# Patient Record
Sex: MALE | Race: WHITE | NOT HISPANIC OR LATINO | Employment: OTHER | URBAN - METROPOLITAN AREA
[De-identification: names, ages, dates, MRNs, and addresses within clinical notes are randomized per-mention and may not be internally consistent; named-entity substitution may affect disease eponyms.]

---

## 2017-02-24 ENCOUNTER — OFFICE VISIT (OUTPATIENT)
Dept: FAMILY MEDICINE CLINIC | Facility: CLINIC | Age: 22
End: 2017-02-24

## 2017-02-24 VITALS
HEIGHT: 75 IN | HEART RATE: 82 BPM | DIASTOLIC BLOOD PRESSURE: 72 MMHG | TEMPERATURE: 98.3 F | RESPIRATION RATE: 16 BRPM | OXYGEN SATURATION: 98 % | WEIGHT: 186 LBS | SYSTOLIC BLOOD PRESSURE: 118 MMHG | BODY MASS INDEX: 23.13 KG/M2

## 2017-02-24 DIAGNOSIS — Z23 NEED FOR TD VACCINE: ICD-10-CM

## 2017-02-24 DIAGNOSIS — F31.61 BIPOLAR DISORDER, CURRENT EPISODE MIXED, MILD (HCC): Primary | ICD-10-CM

## 2017-02-24 DIAGNOSIS — Z11.4 SCREENING FOR HIV (HUMAN IMMUNODEFICIENCY VIRUS): ICD-10-CM

## 2017-02-24 PROCEDURE — 90715 TDAP VACCINE 7 YRS/> IM: CPT | Performed by: FAMILY MEDICINE

## 2017-02-24 PROCEDURE — 90632 HEPA VACCINE ADULT IM: CPT | Performed by: FAMILY MEDICINE

## 2017-02-24 PROCEDURE — 90746 HEPB VACCINE 3 DOSE ADULT IM: CPT | Performed by: FAMILY MEDICINE

## 2017-02-24 PROCEDURE — 90471 IMMUNIZATION ADMIN: CPT | Performed by: FAMILY MEDICINE

## 2017-02-24 PROCEDURE — 99204 OFFICE O/P NEW MOD 45 MIN: CPT | Performed by: FAMILY MEDICINE

## 2017-02-24 PROCEDURE — 90472 IMMUNIZATION ADMIN EACH ADD: CPT | Performed by: FAMILY MEDICINE

## 2017-02-24 NOTE — PROGRESS NOTES
Subjective   Ever Stokes is a 21 y.o. male.     History of Present Illness   Establish care  Needs vaccines UTD. Has never had Hep A and B  Also interested in HPV vaccine series.  Gets tested regularly, every 3 months for HIV.  The following portions of the patient's history were reviewed and updated as appropriate: allergies, current medications, past family history, past medical history, past social history, past surgical history and problem list.    Review of Systems   Constitutional: Negative for appetite change, chills, diaphoresis, fatigue, fever and unexpected weight change.   HENT: Negative for congestion, ear pain, mouth sores, postnasal drip, rhinorrhea, sinus pressure, sneezing, sore throat and trouble swallowing.    Eyes: Negative for pain, redness and visual disturbance.   Respiratory: Negative for apnea, cough, chest tightness, shortness of breath and wheezing.    Cardiovascular: Negative for chest pain, palpitations and leg swelling.   Gastrointestinal: Negative for abdominal distention, blood in stool, constipation, diarrhea and nausea.   Endocrine: Negative for cold intolerance, polydipsia, polyphagia and polyuria.   Genitourinary: Negative for difficulty urinating, dysuria, enuresis, flank pain and urgency.   Musculoskeletal: Negative for arthralgias, back pain, joint swelling, myalgias and neck pain.   Skin: Negative for color change, rash and wound.   Neurological: Negative for dizziness, syncope, weakness, light-headedness and numbness.   Hematological: Negative for adenopathy.   Psychiatric/Behavioral: Negative for agitation, behavioral problems and confusion. The patient is not nervous/anxious.        Objective   Physical Exam   Constitutional: He is oriented to person, place, and time. He appears well-developed and well-nourished. No distress.   HENT:   Right Ear: External ear normal.   Left Ear: External ear normal.   Nose: Nose normal.   Mouth/Throat: Oropharynx is clear and moist.    Eyes: Conjunctivae and EOM are normal. Pupils are equal, round, and reactive to light.   Neck: Normal range of motion. Neck supple. No thyromegaly present.   Cardiovascular: Normal rate, regular rhythm and normal heart sounds.    No murmur heard.  Pulmonary/Chest: Effort normal and breath sounds normal. No respiratory distress. He has no wheezes.   Abdominal: Soft. Bowel sounds are normal. He exhibits no distension and no mass. There is no tenderness. There is no rebound and no guarding. No hernia.   Musculoskeletal: Normal range of motion. He exhibits no edema or tenderness.   Lymphadenopathy:     He has no cervical adenopathy.   Neurological: He is alert and oriented to person, place, and time. He has normal reflexes.   Skin: Skin is warm and dry. No rash noted. He is not diaphoretic. No erythema. No pallor.   Psychiatric: He has a normal mood and affect. His behavior is normal. Judgment and thought content normal.   Nursing note and vitals reviewed.      Assessment/Plan   Ever was seen today for establish care.    Diagnoses and all orders for this visit:    Bipolar disorder, current episode mixed, mild  -     Ambulatory Referral to Psychiatry    Screening for HIV (human immunodeficiency virus)  -     Ambulatory Referral to Infectious Disease    Need for TD vaccine    Other orders  -     Hepatitis A Vaccine Adult IM  -     Hepatitis B Vaccine Adult IM  -     Tdap Vaccine Greater Than or Equal To 6yo IM        I personally spent over half of a total 45 minutes face to face with the patient in counseling and discussion and/or coordination of care as described above.

## 2017-03-07 ENCOUNTER — OFFICE VISIT (OUTPATIENT)
Dept: FAMILY MEDICINE CLINIC | Facility: CLINIC | Age: 22
End: 2017-03-07

## 2017-03-07 VITALS
RESPIRATION RATE: 16 BRPM | HEIGHT: 75 IN | SYSTOLIC BLOOD PRESSURE: 112 MMHG | OXYGEN SATURATION: 98 % | BODY MASS INDEX: 23.25 KG/M2 | WEIGHT: 187 LBS | DIASTOLIC BLOOD PRESSURE: 70 MMHG | HEART RATE: 84 BPM | TEMPERATURE: 97.9 F

## 2017-03-07 DIAGNOSIS — Z72.51 HIGH-RISK SEXUAL BEHAVIOR: Primary | ICD-10-CM

## 2017-03-07 LAB
ALBUMIN SERPL-MCNC: 4.9 G/DL (ref 3.2–4.8)
ALBUMIN/GLOB SERPL: 1.9 G/DL (ref 1.5–2.5)
ALP SERPL-CCNC: 82 U/L (ref 25–100)
ALT SERPL W P-5'-P-CCNC: 24 U/L (ref 7–40)
ANION GAP SERPL CALCULATED.3IONS-SCNC: 8 MMOL/L (ref 3–11)
ARTICHOKE IGE QN: 161 MG/DL (ref 0–130)
AST SERPL-CCNC: 18 U/L (ref 0–33)
BASOPHILS # BLD AUTO: 0.07 10*3/MM3 (ref 0–0.2)
BASOPHILS NFR BLD AUTO: 1.3 % (ref 0–1)
BILIRUB SERPL-MCNC: 0.6 MG/DL (ref 0.3–1.2)
BUN BLD-MCNC: 26 MG/DL (ref 9–23)
BUN/CREAT SERPL: 28.9 (ref 7–25)
CALCIUM SPEC-SCNC: 10.1 MG/DL (ref 8.7–10.4)
CHLORIDE SERPL-SCNC: 102 MMOL/L (ref 99–109)
CHOLEST SERPL-MCNC: 215 MG/DL (ref 0–200)
CO2 SERPL-SCNC: 27 MMOL/L (ref 20–31)
CREAT BLD-MCNC: 0.9 MG/DL (ref 0.6–1.3)
DEPRECATED RDW RBC AUTO: 45 FL (ref 37–54)
EOSINOPHIL # BLD AUTO: 0.09 10*3/MM3 (ref 0.1–0.3)
EOSINOPHIL NFR BLD AUTO: 1.6 % (ref 0–3)
ERYTHROCYTE [DISTWIDTH] IN BLOOD BY AUTOMATED COUNT: 13.4 % (ref 11.3–14.5)
GFR SERPL CREATININE-BSD FRML MDRD: 105 ML/MIN/1.73
GLOBULIN UR ELPH-MCNC: 2.6 GM/DL
GLUCOSE BLD-MCNC: 92 MG/DL (ref 70–100)
HCT VFR BLD AUTO: 46.6 % (ref 38.9–50.9)
HCV AB SER DONR QL: NORMAL
HDLC SERPL-MCNC: 57 MG/DL (ref 40–60)
HGB BLD-MCNC: 15.9 G/DL (ref 13.1–17.5)
HIV1+2 AB SER QL: NORMAL
IMM GRANULOCYTES # BLD: 0.02 10*3/MM3 (ref 0–0.03)
IMM GRANULOCYTES NFR BLD: 0.4 % (ref 0–0.6)
LYMPHOCYTES # BLD AUTO: 2.61 10*3/MM3 (ref 0.6–4.8)
LYMPHOCYTES NFR BLD AUTO: 46.9 % (ref 24–44)
MCH RBC QN AUTO: 31.4 PG (ref 27–31)
MCHC RBC AUTO-ENTMCNC: 34.1 G/DL (ref 32–36)
MCV RBC AUTO: 92.1 FL (ref 80–99)
MONOCYTES # BLD AUTO: 0.49 10*3/MM3 (ref 0–1)
MONOCYTES NFR BLD AUTO: 8.8 % (ref 0–12)
NEUTROPHILS # BLD AUTO: 2.28 10*3/MM3 (ref 1.5–8.3)
NEUTROPHILS NFR BLD AUTO: 41 % (ref 41–71)
PLATELET # BLD AUTO: 213 10*3/MM3 (ref 150–450)
PMV BLD AUTO: 10 FL (ref 6–12)
POTASSIUM BLD-SCNC: 4.7 MMOL/L (ref 3.5–5.5)
PROT SERPL-MCNC: 7.5 G/DL (ref 5.7–8.2)
RBC # BLD AUTO: 5.06 10*6/MM3 (ref 4.2–5.76)
SODIUM BLD-SCNC: 137 MMOL/L (ref 132–146)
TRIGL SERPL-MCNC: 66 MG/DL (ref 0–150)
TSH SERPL DL<=0.05 MIU/L-ACNC: 2.28 MIU/ML (ref 0.35–5.35)
WBC NRBC COR # BLD: 5.56 10*3/MM3 (ref 3.5–10.8)

## 2017-03-07 PROCEDURE — 85025 COMPLETE CBC W/AUTO DIFF WBC: CPT | Performed by: FAMILY MEDICINE

## 2017-03-07 PROCEDURE — 36415 COLL VENOUS BLD VENIPUNCTURE: CPT | Performed by: FAMILY MEDICINE

## 2017-03-07 PROCEDURE — 80053 COMPREHEN METABOLIC PANEL: CPT | Performed by: FAMILY MEDICINE

## 2017-03-07 PROCEDURE — 80061 LIPID PANEL: CPT | Performed by: FAMILY MEDICINE

## 2017-03-07 PROCEDURE — 90651 9VHPV VACCINE 2/3 DOSE IM: CPT | Performed by: FAMILY MEDICINE

## 2017-03-07 PROCEDURE — 99213 OFFICE O/P EST LOW 20 MIN: CPT | Performed by: FAMILY MEDICINE

## 2017-03-07 PROCEDURE — G0432 EIA HIV-1/HIV-2 SCREEN: HCPCS | Performed by: FAMILY MEDICINE

## 2017-03-07 PROCEDURE — 86803 HEPATITIS C AB TEST: CPT | Performed by: FAMILY MEDICINE

## 2017-03-07 PROCEDURE — 90471 IMMUNIZATION ADMIN: CPT | Performed by: FAMILY MEDICINE

## 2017-03-07 PROCEDURE — 84443 ASSAY THYROID STIM HORMONE: CPT | Performed by: FAMILY MEDICINE

## 2017-03-07 NOTE — PROGRESS NOTES
Subjective   Ever Stokes is a 21 y.o. male.     History of Present Illness   Pt wants to go on HIV med for prevention of HIV. Wants to have an appointment with ID but needs labs first. Has had negative HIV tests in past.  The following portions of the patient's history were reviewed and updated as appropriate: allergies, current medications, past family history, past medical history, past social history, past surgical history and problem list.    Review of Systems   Constitutional: Negative for appetite change, chills, diaphoresis, fatigue, fever and unexpected weight change.   HENT: Negative for congestion, ear pain, mouth sores, postnasal drip, rhinorrhea, sinus pressure, sneezing, sore throat and trouble swallowing.    Eyes: Negative for pain, redness and visual disturbance.   Respiratory: Negative for apnea, cough, chest tightness, shortness of breath and wheezing.    Cardiovascular: Negative for chest pain, palpitations and leg swelling.   Gastrointestinal: Negative for abdominal distention, blood in stool, constipation, diarrhea and nausea.   Endocrine: Negative for cold intolerance, polydipsia, polyphagia and polyuria.   Genitourinary: Negative for difficulty urinating, dysuria, enuresis, flank pain and urgency.   Musculoskeletal: Negative for arthralgias, back pain, joint swelling, myalgias and neck pain.   Skin: Negative for color change, rash and wound.   Neurological: Negative for dizziness, syncope, weakness, light-headedness and numbness.   Hematological: Negative for adenopathy.   Psychiatric/Behavioral: Negative for agitation, behavioral problems and confusion. The patient is not nervous/anxious.        Objective   Physical Exam   Constitutional: He is oriented to person, place, and time. He appears well-developed and well-nourished. No distress.   HENT:   Right Ear: External ear normal.   Left Ear: External ear normal.   Nose: Nose normal.   Mouth/Throat: Oropharynx is clear and moist.   Eyes:  Conjunctivae and EOM are normal. Pupils are equal, round, and reactive to light.   Neck: Normal range of motion. Neck supple. No thyromegaly present.   Cardiovascular: Normal rate, regular rhythm and normal heart sounds.    No murmur heard.  Pulmonary/Chest: Effort normal and breath sounds normal. No respiratory distress. He has no wheezes.   Abdominal: Soft. Bowel sounds are normal. He exhibits no distension and no mass. There is no tenderness. There is no rebound and no guarding. No hernia.   Musculoskeletal: Normal range of motion. He exhibits no edema or tenderness.   Lymphadenopathy:     He has no cervical adenopathy.   Neurological: He is alert and oriented to person, place, and time. He has normal reflexes.   Skin: Skin is warm and dry. No rash noted. He is not diaphoretic. No erythema. No pallor.   Psychiatric: He has a normal mood and affect. His behavior is normal. Judgment and thought content normal.   Nursing note and vitals reviewed.      Assessment/Plan   Ever was seen today for follow-up.    Diagnoses and all orders for this visit:    High-risk sexual behavior  -     CBC & Differential  -     Comprehensive Metabolic Panel  -     Lipid Panel  -     HIV-1 / O / 2 Ag / Antibody 4th Generation  -     TSH  -     Hepatitis C Antibody  -     HPV Vaccine  -     CBC Auto Differential

## 2017-07-12 ENCOUNTER — LAB (OUTPATIENT)
Dept: LAB | Facility: HOSPITAL | Age: 22
End: 2017-07-12

## 2017-07-12 ENCOUNTER — TRANSCRIBE ORDERS (OUTPATIENT)
Dept: LAB | Facility: HOSPITAL | Age: 22
End: 2017-07-12

## 2017-07-12 DIAGNOSIS — Z72.52 HIGH RISK HOMOSEXUAL BEHAVIOR: ICD-10-CM

## 2017-07-12 DIAGNOSIS — Z20.6 EXPOSURE TO HIV: ICD-10-CM

## 2017-07-12 DIAGNOSIS — Z79.899 POLYPHARMACY: ICD-10-CM

## 2017-07-12 DIAGNOSIS — Z20.6 EXPOSURE TO HIV: Primary | ICD-10-CM

## 2017-07-12 LAB
ALBUMIN SERPL-MCNC: 4.6 G/DL (ref 3.2–4.8)
ALBUMIN/GLOB SERPL: 1.8 G/DL (ref 1.5–2.5)
ALP SERPL-CCNC: 99 U/L (ref 25–100)
ALT SERPL W P-5'-P-CCNC: 18 U/L (ref 7–40)
ANION GAP SERPL CALCULATED.3IONS-SCNC: 4 MMOL/L (ref 3–11)
AST SERPL-CCNC: 17 U/L (ref 0–33)
BILIRUB SERPL-MCNC: 0.7 MG/DL (ref 0.3–1.2)
BUN BLD-MCNC: 14 MG/DL (ref 9–23)
BUN/CREAT SERPL: 17.5 (ref 7–25)
CALCIUM SPEC-SCNC: 10 MG/DL (ref 8.7–10.4)
CHLORIDE SERPL-SCNC: 108 MMOL/L (ref 99–109)
CO2 SERPL-SCNC: 27 MMOL/L (ref 20–31)
CREAT BLD-MCNC: 0.8 MG/DL (ref 0.6–1.3)
GFR SERPL CREATININE-BSD FRML MDRD: 120 ML/MIN/1.73
GLOBULIN UR ELPH-MCNC: 2.6 GM/DL
GLUCOSE BLD-MCNC: 96 MG/DL (ref 70–100)
POTASSIUM BLD-SCNC: 4.4 MMOL/L (ref 3.5–5.5)
PROT SERPL-MCNC: 7.2 G/DL (ref 5.7–8.2)
SODIUM BLD-SCNC: 139 MMOL/L (ref 132–146)

## 2017-07-12 PROCEDURE — 36415 COLL VENOUS BLD VENIPUNCTURE: CPT

## 2017-07-12 PROCEDURE — 80053 COMPREHEN METABOLIC PANEL: CPT | Performed by: INTERNAL MEDICINE

## 2017-10-11 ENCOUNTER — LAB (OUTPATIENT)
Dept: LAB | Facility: HOSPITAL | Age: 22
End: 2017-10-11

## 2017-10-11 ENCOUNTER — LAB REQUISITION (OUTPATIENT)
Dept: LAB | Facility: HOSPITAL | Age: 22
End: 2017-10-11

## 2017-10-11 ENCOUNTER — TRANSCRIBE ORDERS (OUTPATIENT)
Dept: LAB | Facility: HOSPITAL | Age: 22
End: 2017-10-11

## 2017-10-11 DIAGNOSIS — Z79.899 NEED FOR PROPHYLACTIC CHEMOTHERAPY: ICD-10-CM

## 2017-10-11 DIAGNOSIS — Z72.52 HIGH RISK HOMOSEXUAL BEHAVIOR: ICD-10-CM

## 2017-10-11 DIAGNOSIS — Z72.52 HIGH RISK HOMOSEXUAL BEHAVIOR: Primary | ICD-10-CM

## 2017-10-11 DIAGNOSIS — Z00.00 ROUTINE GENERAL MEDICAL EXAMINATION AT A HEALTH CARE FACILITY: ICD-10-CM

## 2017-10-11 LAB
ALBUMIN SERPL-MCNC: 4.7 G/DL (ref 3.2–4.8)
ALBUMIN/GLOB SERPL: 2 G/DL (ref 1.5–2.5)
ALP SERPL-CCNC: 110 U/L (ref 25–100)
ALT SERPL W P-5'-P-CCNC: 22 U/L (ref 7–40)
ANION GAP SERPL CALCULATED.3IONS-SCNC: 5 MMOL/L (ref 3–11)
AST SERPL-CCNC: 15 U/L (ref 0–33)
BACTERIA UR QL AUTO: ABNORMAL /HPF
BILIRUB SERPL-MCNC: 0.5 MG/DL (ref 0.3–1.2)
BILIRUB UR QL STRIP: NEGATIVE
BUN BLD-MCNC: 15 MG/DL (ref 9–23)
BUN/CREAT SERPL: 16.7 (ref 7–25)
CALCIUM SPEC-SCNC: 9.4 MG/DL (ref 8.7–10.4)
CHLORIDE SERPL-SCNC: 106 MMOL/L (ref 99–109)
CLARITY UR: CLEAR
CO2 SERPL-SCNC: 29 MMOL/L (ref 20–31)
COLOR UR: YELLOW
CREAT BLD-MCNC: 0.9 MG/DL (ref 0.6–1.3)
GFR SERPL CREATININE-BSD FRML MDRD: 104 ML/MIN/1.73
GLOBULIN UR ELPH-MCNC: 2.4 GM/DL
GLUCOSE BLD-MCNC: 99 MG/DL (ref 70–100)
GLUCOSE UR STRIP-MCNC: NEGATIVE MG/DL
HGB UR QL STRIP.AUTO: ABNORMAL
HYALINE CASTS UR QL AUTO: ABNORMAL /LPF
KETONES UR QL STRIP: NEGATIVE
LEUKOCYTE ESTERASE UR QL STRIP.AUTO: NEGATIVE
NITRITE UR QL STRIP: NEGATIVE
PH UR STRIP.AUTO: 6 [PH] (ref 5–8)
POTASSIUM BLD-SCNC: 4.3 MMOL/L (ref 3.5–5.5)
PROT SERPL-MCNC: 7.1 G/DL (ref 5.7–8.2)
PROT UR QL STRIP: NEGATIVE
RBC # UR: ABNORMAL /HPF
REF LAB TEST METHOD: ABNORMAL
SODIUM BLD-SCNC: 140 MMOL/L (ref 132–146)
SP GR UR STRIP: 1.02 (ref 1–1.03)
SQUAMOUS #/AREA URNS HPF: ABNORMAL /HPF
UROBILINOGEN UR QL STRIP: ABNORMAL
WBC UR QL AUTO: ABNORMAL /HPF

## 2017-10-11 PROCEDURE — 36415 COLL VENOUS BLD VENIPUNCTURE: CPT

## 2017-10-11 PROCEDURE — 80053 COMPREHEN METABOLIC PANEL: CPT | Performed by: INTERNAL MEDICINE

## 2017-10-11 PROCEDURE — 81001 URINALYSIS AUTO W/SCOPE: CPT | Performed by: INTERNAL MEDICINE

## 2018-03-07 ENCOUNTER — TRANSCRIBE ORDERS (OUTPATIENT)
Dept: LAB | Facility: HOSPITAL | Age: 23
End: 2018-03-07

## 2018-03-07 ENCOUNTER — APPOINTMENT (OUTPATIENT)
Dept: LAB | Facility: HOSPITAL | Age: 23
End: 2018-03-07

## 2018-03-07 DIAGNOSIS — Z72.52 HIGH RISK HOMOSEXUAL BEHAVIOR: ICD-10-CM

## 2018-03-07 DIAGNOSIS — Z79.899 NEED FOR PROPHYLACTIC CHEMOTHERAPY: Primary | ICD-10-CM

## 2018-03-07 LAB
ALBUMIN SERPL-MCNC: 4.8 G/DL (ref 3.2–4.8)
ALBUMIN/GLOB SERPL: 1.9 G/DL (ref 1.5–2.5)
ALP SERPL-CCNC: 94 U/L (ref 25–100)
ALT SERPL W P-5'-P-CCNC: 41 U/L (ref 7–40)
ANION GAP SERPL CALCULATED.3IONS-SCNC: 5 MMOL/L (ref 3–11)
AST SERPL-CCNC: 22 U/L (ref 0–33)
BILIRUB SERPL-MCNC: 0.8 MG/DL (ref 0.3–1.2)
BUN BLD-MCNC: 17 MG/DL (ref 9–23)
BUN/CREAT SERPL: 18.9 (ref 7–25)
CALCIUM SPEC-SCNC: 9.6 MG/DL (ref 8.7–10.4)
CHLORIDE SERPL-SCNC: 107 MMOL/L (ref 99–109)
CO2 SERPL-SCNC: 27 MMOL/L (ref 20–31)
CREAT BLD-MCNC: 0.9 MG/DL (ref 0.6–1.3)
GFR SERPL CREATININE-BSD FRML MDRD: 104 ML/MIN/1.73
GLOBULIN UR ELPH-MCNC: 2.5 GM/DL
GLUCOSE BLD-MCNC: 102 MG/DL (ref 70–100)
POTASSIUM BLD-SCNC: 4.4 MMOL/L (ref 3.5–5.5)
PROT SERPL-MCNC: 7.3 G/DL (ref 5.7–8.2)
SODIUM BLD-SCNC: 139 MMOL/L (ref 132–146)

## 2018-03-07 PROCEDURE — 80053 COMPREHEN METABOLIC PANEL: CPT | Performed by: INTERNAL MEDICINE

## 2018-03-07 PROCEDURE — 36415 COLL VENOUS BLD VENIPUNCTURE: CPT | Performed by: INTERNAL MEDICINE

## 2018-06-06 ENCOUNTER — LAB REQUISITION (OUTPATIENT)
Dept: LAB | Facility: HOSPITAL | Age: 23
End: 2018-06-06

## 2018-06-06 DIAGNOSIS — Z00.00 ROUTINE GENERAL MEDICAL EXAMINATION AT A HEALTH CARE FACILITY: ICD-10-CM

## 2018-06-06 DIAGNOSIS — Z72.52 HIGH RISK HOMOSEXUAL BEHAVIOR: ICD-10-CM

## 2018-06-06 DIAGNOSIS — Z79.899 OTHER LONG TERM (CURRENT) DRUG THERAPY: ICD-10-CM
